# Patient Record
Sex: MALE | Race: WHITE | NOT HISPANIC OR LATINO | URBAN - METROPOLITAN AREA
[De-identification: names, ages, dates, MRNs, and addresses within clinical notes are randomized per-mention and may not be internally consistent; named-entity substitution may affect disease eponyms.]

---

## 2023-12-15 ENCOUNTER — HOSPITAL ENCOUNTER (EMERGENCY)
Facility: HOSPITAL | Age: 23
Discharge: PSYCHIATRIC OR SUBSTANCE USE FACILITY | End: 2023-12-16
Attending: STUDENT IN AN ORGANIZED HEALTH CARE EDUCATION/TRAINING PROGRAM

## 2023-12-15 DIAGNOSIS — F29 PSYCHOSIS, UNSPECIFIED PSYCHOSIS TYPE: ICD-10-CM

## 2023-12-15 DIAGNOSIS — F22 DELUSIONS: ICD-10-CM

## 2023-12-15 DIAGNOSIS — F22 PARANOIA: Primary | ICD-10-CM

## 2023-12-15 LAB
AMPHET+METHAMPHET UR QL: NEGATIVE
APAP SERPL-MCNC: <3 UG/ML (ref 10–20)
BACTERIA #/AREA URNS AUTO: NORMAL /HPF
BARBITURATES UR QL SCN>200 NG/ML: NEGATIVE
BASOPHILS # BLD AUTO: 0.04 K/UL (ref 0–0.2)
BASOPHILS NFR BLD: 0.5 % (ref 0–1.9)
BENZODIAZ UR QL SCN>200 NG/ML: NEGATIVE
BILIRUB UR QL STRIP: NEGATIVE
BZE UR QL SCN: NEGATIVE
CANNABINOIDS UR QL SCN: NEGATIVE
CK SERPL-CCNC: 124 U/L (ref 20–200)
CLARITY UR REFRACT.AUTO: CLEAR
COLOR UR AUTO: YELLOW
CREAT UR-MCNC: 140 MG/DL (ref 23–375)
DIFFERENTIAL METHOD: ABNORMAL
EOSINOPHIL # BLD AUTO: 0.3 K/UL (ref 0–0.5)
EOSINOPHIL NFR BLD: 3.3 % (ref 0–8)
ERYTHROCYTE [DISTWIDTH] IN BLOOD BY AUTOMATED COUNT: 12.3 % (ref 11.5–14.5)
ETHANOL SERPL-MCNC: <10 MG/DL
GLUCOSE UR QL STRIP: NEGATIVE
HCT VFR BLD AUTO: 42.6 % (ref 40–54)
HCV AB SERPL QL IA: NORMAL
HGB BLD-MCNC: 14.9 G/DL (ref 14–18)
HGB UR QL STRIP: NEGATIVE
HIV 1+2 AB+HIV1 P24 AG SERPL QL IA: NORMAL
IMM GRANULOCYTES # BLD AUTO: 0.02 K/UL (ref 0–0.04)
IMM GRANULOCYTES NFR BLD AUTO: 0.3 % (ref 0–0.5)
KETONES UR QL STRIP: NEGATIVE
LEUKOCYTE ESTERASE UR QL STRIP: ABNORMAL
LYMPHOCYTES # BLD AUTO: 2.4 K/UL (ref 1–4.8)
LYMPHOCYTES NFR BLD: 31.8 % (ref 18–48)
MCH RBC QN AUTO: 31.4 PG (ref 27–31)
MCHC RBC AUTO-ENTMCNC: 35 G/DL (ref 32–36)
MCV RBC AUTO: 90 FL (ref 82–98)
METHADONE UR QL SCN>300 NG/ML: NEGATIVE
MICROSCOPIC COMMENT: NORMAL
MONOCYTES # BLD AUTO: 0.7 K/UL (ref 0.3–1)
MONOCYTES NFR BLD: 8.6 % (ref 4–15)
NEUTROPHILS # BLD AUTO: 4.2 K/UL (ref 1.8–7.7)
NEUTROPHILS NFR BLD: 55.5 % (ref 38–73)
NITRITE UR QL STRIP: NEGATIVE
NRBC BLD-RTO: 0 /100 WBC
OPIATES UR QL SCN: NEGATIVE
PCP UR QL SCN>25 NG/ML: NEGATIVE
PH UR STRIP: 7 [PH] (ref 5–8)
PLATELET # BLD AUTO: 347 K/UL (ref 150–450)
PMV BLD AUTO: 10 FL (ref 9.2–12.9)
PROT UR QL STRIP: ABNORMAL
RBC # BLD AUTO: 4.74 M/UL (ref 4.6–6.2)
RBC #/AREA URNS AUTO: 0 /HPF (ref 0–4)
SALICYLATES SERPL-MCNC: <5 MG/DL (ref 15–30)
SP GR UR STRIP: 1.02 (ref 1–1.03)
TOXICOLOGY INFORMATION: NORMAL
TSH SERPL DL<=0.005 MIU/L-ACNC: 1.26 UIU/ML (ref 0.4–4)
URN SPEC COLLECT METH UR: ABNORMAL
WBC # BLD AUTO: 7.55 K/UL (ref 3.9–12.7)
WBC #/AREA URNS AUTO: 4 /HPF (ref 0–5)

## 2023-12-15 PROCEDURE — 99285 EMERGENCY DEPT VISIT HI MDM: CPT | Mod: 25

## 2023-12-15 NOTE — ED NOTES
"[place] [facility] [place] with paranoia, delusions. Pt refusing to give legal name, states to call him "Cyndi." He is here on a classified trip after 2 day train ride, states he works for the [employer] and lives in [place]. Pt malodorous, unkempt. Calm and cooperative. Placed in paper scrubs, all items and equipment removed from room.     Patient identifiers for [place] 89 y.o. male checked and correct.  Chief Complaint   Patient presents with    Psychiatric Evaluation     [place] [facility] [place] [facility], pt with delusions and paranoia, states can't give his name because he works for the [employer]     No past medical history on file.  Allergies reported: Review of patient's allergies indicates:  Not on File    Appearance: Pt awake, alert & oriented to person, place & time. Pt in no acute distress at present time. Pt is unkempt  Skin: Skin warm, dry & intact. Color consistent with ethnicity. Mucous membranes moist. No breakdown or brusing noted.   Musculoskeletal: Patient moving all extremities well, no obvious swelling or deformities noted.   Respiratory: Respirations spontaneous, even, and non-labored. Visible chest rise noted. Airway is open and patent. No accessory muscle use noted.   Neurologic: Sensation is intact. Speech is clear and appropriate. Eyes open spontaneously, behavior appropriate to situation, follows commands, facial expression symmetrical, bilateral hand grasp equal and even, purposeful motor response noted.  Cardiac: All peripheral pulses present. No Bilateral lower extremity edema. Cap refill is <3 seconds.  Abdomen: Abdomen soft, non distended, non tender to palpation.   : Pt voids independently, denies dysuria, hematuria, frequency.    "

## 2023-12-15 NOTE — ED PROVIDER NOTES
"Encounter Date: 12/15/2023       History     Chief Complaint   Patient presents with    Psychiatric Evaluation     [place] [facility] [place] [place] pt with delusions and paranoia, states can't give his name because he works for the [employer]     HPI    Unknown age male who presents to the ER with officers for psychiatric evaluation.  Limited history as the patient has active delusions and refuses to elaborate further.  Reportedly he was found wandering the streets and brought in.  He has refused to give his name or his age.  Repeatedly stating that he works for the [employer] and does not have clearance to give us his name."  Patient is able to give some information, states he lives with his aunt.  He notes a history of schizophrenia, but states he is currently not on any medications.      Review of patient's allergies indicates:  Not on File  No past medical history on file.  No past surgical history on file.  No family history on file.     Review of Systems   Unable to perform ROS: Psychiatric disorder       Physical Exam     Initial Vitals [12/15/23 0905]   BP Pulse Resp Temp SpO2   125/84 94 20 98.7 °F (37.1 °C) 99 %      MAP       --         Physical Exam    Nursing note and vitals reviewed.  Constitutional:   Unkempt, disheveled   HENT:   Head: Atraumatic.   Eyes: EOM are normal.   Neck: Neck supple.   Cardiovascular:  Normal rate and regular rhythm.           Pulmonary/Chest: Breath sounds normal. No respiratory distress.   Musculoskeletal:         General: No edema. Normal range of motion.      Cervical back: Neck supple.     Neurological: He is alert.   Awake, refuses to give his name   Skin: Skin is warm and dry.   Psychiatric:   Moves all extremities, normal gait, responding to internal stimuli, delusional and paranoid         ED Course   Procedures  Labs Reviewed   CBC W/ AUTO DIFFERENTIAL - Abnormal; Notable for the following components:       Result Value    MCH 31.4 (*)     All other components within normal " Problem: SAFETY  Goal: Free from accidental physical injury  Outcome: Ongoing    Goal: Free from intentional harm  Outcome: Ongoing      Problem: DAILY CARE  Goal: Daily care needs are met  Outcome: Ongoing      Problem: PAIN  Goal: Patient's pain/discomfort is manageable  Outcome: Ongoing limits    Narrative:     Release to patient->Immediate   COMPREHENSIVE METABOLIC PANEL - Abnormal; Notable for the following components:    BUN 9 (*)     All other components within normal limits    Narrative:     add on cpk per  /order#[id number] @ 12/15/2023  09:58       Release to patient->Immediate   URINALYSIS, REFLEX TO URINE CULTURE - Abnormal; Notable for the following components:    Protein, UA Trace (*)     Leukocytes, UA Trace (*)     All other components within normal limits    Narrative:     Specimen Source->Urine   ACETAMINOPHEN LEVEL - Abnormal; Notable for the following components:    Acetaminophen (Tylenol), Serum <3.0 (*)     All other components within normal limits    Narrative:     add on cpk per  /order#[id number] @ 12/15/2023  09:58       Release to patient->Immediate   SALICYLATE LEVEL - Abnormal; Notable for the following components:    Salicylate Lvl <5.0 (*)     All other components within normal limits    Narrative:     add on cpk per  /order#[id number] @ 12/15/2023  09:58       Release to patient->Immediate   HIV 1 / 2 ANTIBODY    Narrative:     add on cpk per  /order#[id number] @ 12/15/2023  09:58       Release to patient->Immediate   HEPATITIS C ANTIBODY    Narrative:     add on cpk per  /order#[id number] @ 12/15/2023  09:58       Release to patient->Immediate   TSH    Narrative:     add on cpk per  /order#[id number] @ 12/15/2023  09:58       Release to patient->Immediate   DRUG SCREEN PANEL, URINE EMERGENCY    Narrative:     Specimen Source->Urine   ALCOHOL,MEDICAL (ETHANOL)    Narrative:     add on cpk per  /order#[id number] @ 12/15/2023  09:58       Release to patient->Immediate   CK   CK    Narrative:     add on cpk per  /order#[id number] @ 12/15/2023  09:58       Release to patient->Immediate   URINALYSIS MICROSCOPIC    Narrative:     Specimen Source->Urine          Imaging Results              CT Head Without  "Contrast (Final result)  Result time 12/15/23 11:36:03      Final result by Abimael Decker MD (12/15/23 11:36:03)                   Impression:      No acute intracranial process.      Electronically signed by: Abimael Decker  Date:    12/15/2023  Time:    11:36               Narrative:    EXAMINATION:  CT HEAD WITHOUT CONTRAST    CLINICAL HISTORY:  Mental status change, unknown cause;    TECHNIQUE:  Low dose axial images were obtained through the head.  Coronal and sagittal reformations were also performed. Contrast was not administered.    COMPARISON:  None.    FINDINGS:  No evidence of hydrocephalus, mass effect, intracranial hemorrhage or acute territorial infarct.    The brain parenchyma maintains normal attenuation    The calvarium is intact. The visualized sinuses and mastoid air cells are clear.                                       Medications - No data to display  Medical Decision Making  Amount and/or Complexity of Data Reviewed  Labs: ordered. Decision-making details documented in ED Course.  Radiology: ordered and independent interpretation performed. Decision-making details documented in ED Course.                  Medically cleared for psychiatry placement: 12/15/2023 12:47 PM             Unknown age male presenting with delusions and psychosis.  Vital signs stable in emergency room, on my initial exam the patient is refusing to give his name or birth date, he can not state the circumstances under which he arrived here today.  He was responding to internal stimuli, and has multiple delusions related to "working for the [employer]."  PEC order was placed, he has no capacity at this time to make his own decisions.  Labs with no leukocytosis, electrolytes are normal, CPK is normal, U tox negative.  CT brain was negative.  Medically stable for psychiatric placement.       Clinical Impression:  Final diagnoses:  [F22] Paranoia (Primary)  [F29] Psychosis, unspecified psychosis type  [F22] Delusions          ED " Disposition Condition    Transfer to Psych Facility Stable          ED Prescriptions    None       Follow-up Information    None          Babak Nelson MD  12/15/23 9140

## 2023-12-16 VITALS
WEIGHT: 156 LBS | RESPIRATION RATE: 20 BRPM | SYSTOLIC BLOOD PRESSURE: 111 MMHG | OXYGEN SATURATION: 98 % | HEART RATE: 58 BPM | TEMPERATURE: 98 F | DIASTOLIC BLOOD PRESSURE: 59 MMHG

## 2023-12-16 NOTE — ED NOTES
"Provided a sandwich & a serving of Apple Juice, consumed 100 %. He provided limited information. He states that he lives w/ his aunt but refuses to give aunt's name. 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

## 2023-12-16 NOTE — ED NOTES
Spoke with transfer center regarding update of patient Name and  provided by patient in chart. Transfer center aware.

## 2023-12-16 NOTE — ED NOTES
"The patient is recv'd lying supine in bed w/ head nearly out of bed, legs positioned to the opposite side of the bed. Both side rails lifted to the upright position for safety. Attempted to engage patient, requesting his name & states,"Cyndi." He is attired in [facility] provided scrubs w/ a disheveled appearance.He is maintained on DVC for safety.  "

## 2023-12-16 NOTE — ED NOTES
Patient gives the following information, bornFeb 27, 2000 as Cyndi Escalante. [facility]

## 2023-12-16 NOTE — ED NOTES
"Attempting to obtain information regarding patient's identity. He states his name as " Boris." When  asked his last name he states, " I'd rather not say." He then states, " I'm [employer]." He resumed a resting position w/ eyes closed, rise/fall of chest noted. DVC maintained for safety.  "

## 2023-12-16 NOTE — ED NOTES
Eating 2nd sandwich provided. NAD, offering no complaints. Awaiting placement. DVC maintained for safety.

## 2023-12-16 NOTE — ED NOTES
Reviewed clothing in belongings bag hoping to locate an identifying name as patient currently is an unknown male. There is no identifying name in the clothing.

## 2023-12-16 NOTE — ED NOTES
Pt remains in paper scrubs, resting in stretcher comfortably. No signs of distress noted. Sitter remains at bedside in direct visual contact, charting per protocol every 15 minutes. No equipment or belongings are in the patients room. Will continue to monitor.

## 2023-12-16 NOTE — ED NOTES
Resting quietly in bed w/ eyes closed, rise/fall of chest noted. DVC maintained for safety. Awaiting placement.

## 2023-12-18 LAB
ALBUMIN SERPL BCP-MCNC: 4.3 G/DL (ref 3.5–5.2)
ALP SERPL-CCNC: 104 U/L (ref 55–135)
ALT SERPL W/O P-5'-P-CCNC: 16 U/L (ref 10–44)
ANION GAP SERPL CALC-SCNC: 11 MMOL/L (ref 8–16)
AST SERPL-CCNC: 22 U/L (ref 10–40)
BILIRUB SERPL-MCNC: 0.3 MG/DL (ref 0.1–1)
BUN SERPL-MCNC: 9 MG/DL (ref 6–20)
CALCIUM SERPL-MCNC: 9.4 MG/DL (ref 8.7–10.5)
CHLORIDE SERPL-SCNC: 104 MMOL/L (ref 95–110)
CO2 SERPL-SCNC: 25 MMOL/L (ref 23–29)
CREAT SERPL-MCNC: 0.8 MG/DL (ref 0.5–1.4)
EST. GFR  (NO RACE VARIABLE): >60 ML/MIN/1.73 M^2
GLUCOSE SERPL-MCNC: 90 MG/DL (ref 70–110)
POTASSIUM SERPL-SCNC: 4.2 MMOL/L (ref 3.5–5.1)
PROT SERPL-MCNC: 8 G/DL (ref 6–8.4)
SODIUM SERPL-SCNC: 140 MMOL/L (ref 136–145)